# Patient Record
Sex: MALE | Race: OTHER | URBAN - METROPOLITAN AREA
[De-identification: names, ages, dates, MRNs, and addresses within clinical notes are randomized per-mention and may not be internally consistent; named-entity substitution may affect disease eponyms.]

---

## 2017-01-12 ENCOUNTER — HOSPITAL ENCOUNTER (OUTPATIENT)
Dept: LAB | Age: 42
Discharge: HOME | End: 2017-01-12

## 2017-01-12 ENCOUNTER — OFFICE VISIT (OUTPATIENT)
Dept: FAMILY MEDICINE CLINIC | Age: 42
End: 2017-01-12

## 2017-01-12 VITALS
WEIGHT: 136.8 LBS | HEIGHT: 65 IN | BODY MASS INDEX: 22.79 KG/M2 | DIASTOLIC BLOOD PRESSURE: 81 MMHG | HEART RATE: 59 BPM | SYSTOLIC BLOOD PRESSURE: 126 MMHG | TEMPERATURE: 98 F

## 2017-01-12 DIAGNOSIS — Z86.39 HISTORY OF HIGH CHOLESTEROL: ICD-10-CM

## 2017-01-12 DIAGNOSIS — Z23 ENCOUNTER FOR IMMUNIZATION: ICD-10-CM

## 2017-01-12 DIAGNOSIS — H61.22 IMPACTED CERUMEN OF LEFT EAR: ICD-10-CM

## 2017-01-12 DIAGNOSIS — N52.9 ERECTILE DYSFUNCTION, UNSPECIFIED ERECTILE DYSFUNCTION TYPE: ICD-10-CM

## 2017-01-12 DIAGNOSIS — R42 VERTIGO: Primary | ICD-10-CM

## 2017-01-12 LAB
ALBUMIN SERPL BCP-MCNC: 4.1 G/DL (ref 3.5–5)
ALBUMIN/GLOB SERPL: 1.1 {RATIO} (ref 1.1–2.2)
ALP SERPL-CCNC: 75 U/L (ref 45–117)
ALT SERPL-CCNC: 72 U/L (ref 12–78)
ANION GAP BLD CALC-SCNC: 6 MMOL/L (ref 5–15)
AST SERPL W P-5'-P-CCNC: 28 U/L (ref 15–37)
BILIRUB SERPL-MCNC: 0.3 MG/DL (ref 0.2–1)
BUN SERPL-MCNC: 15 MG/DL (ref 6–20)
BUN/CREAT SERPL: 17 (ref 12–20)
CALCIUM SERPL-MCNC: 8.9 MG/DL (ref 8.5–10.1)
CHLORIDE SERPL-SCNC: 100 MMOL/L (ref 97–108)
CHOLEST SERPL-MCNC: 170 MG/DL
CO2 SERPL-SCNC: 31 MMOL/L (ref 21–32)
CREAT SERPL-MCNC: 0.88 MG/DL (ref 0.7–1.3)
GLOBULIN SER CALC-MCNC: 3.8 G/DL (ref 2–4)
GLUCOSE SERPL-MCNC: 92 MG/DL (ref 65–100)
HDLC SERPL-MCNC: 40 MG/DL
HDLC SERPL: 4.3 {RATIO} (ref 0–5)
LDLC SERPL CALC-MCNC: 86 MG/DL (ref 0–100)
LIPID PROFILE,FLP: ABNORMAL
POTASSIUM SERPL-SCNC: 4.9 MMOL/L (ref 3.5–5.1)
PROT SERPL-MCNC: 7.9 G/DL (ref 6.4–8.2)
SODIUM SERPL-SCNC: 137 MMOL/L (ref 136–145)
TRIGL SERPL-MCNC: 220 MG/DL (ref ?–150)
VLDLC SERPL CALC-MCNC: 44 MG/DL

## 2017-01-12 PROCEDURE — 80061 LIPID PANEL: CPT | Performed by: PHYSICIAN ASSISTANT

## 2017-01-12 PROCEDURE — 80053 COMPREHEN METABOLIC PANEL: CPT | Performed by: PHYSICIAN ASSISTANT

## 2017-01-12 RX ORDER — MECLIZINE HYDROCHLORIDE 25 MG/1
25 TABLET ORAL
Qty: 30 TAB | Refills: 0 | Status: SHIPPED | OUTPATIENT
Start: 2017-01-12 | End: 2017-01-22

## 2017-01-12 NOTE — PROGRESS NOTES
Assessment/Plan:    Iza Sanchez was seen today for:    Diagnoses and all orders for this visit:    Vertigo  -     meclizine (ANTIVERT) 25 mg tablet; Take 1 Tab by mouth three (3) times daily as needed for up to 10 days. [place] 1 pastilla 3 veces al marilee por king mareo  Indications: VERTIGO    Erectile dysfunction, unspecified erectile dysfunction type  -     REFERRAL TO UROLOGY    History of high cholesterol  -     LIPID PANEL; Future  -     METABOLIC PANEL, COMPREHENSIVE; Future    Impacted cerumen of left ear  -     carbamide peroxide (DEBROX) 6.5 % otic solution; Administer 5 Drops into each ear two (2) times a day. [place] 5 gotas en king oido 2 veces al marilee        Follow-up Disposition: Not [facility] expressed understanding of this plan. An AVS was printed and given to the patient.      ----------------------------------------------------------------------    Chief Complaint   Patient presents with    Ear Pain     x 1 month with a fullness in the left ear       History of Present Illness:    New pt here for left ear \"feels blocked\" minimal pain and some vertigo sxs- like the room is spinning around him. He denies fever, cough, runny nose, aches or pains. The sxs have been present for at least 1 week now. He does not have any hx of HTN or DM. He states that about 5 years ago his cholesterol was \"high\". He has not had it checked since then but he came fasting today to have this done. He is a non smoker  He is an  by trade  The pt very tentatively brought up a 2 year problem with ED that is causing a lot of problems with his wife. He appears very uncomfortable discussing this with me and I offered to refer him to our male urologist which he was happy to accept. No past medical history on file. Current Outpatient Prescriptions   Medication Sig Dispense Refill    carbamide peroxide (DEBROX) 6.5 % otic solution Administer 5 Drops into each ear two (2) times a day. [place] 5 gotas en king oido 2 veces al marilee 7.5 mL 1    meclizine (ANTIVERT) 25 mg tablet Take 1 Tab by mouth three (3) times daily as needed for up to 10 days. [place] 1 pastilla 3 veces al marilee por king mareo  Indications: VERTIGO 30 Tab 0       Allergies no known allergies    Social History   Substance Use Topics    Smoking status: Never Smoker    Smokeless tobacco: Not on file    Alcohol use No       No family history on file. Physical Exam:     Visit Vitals    /81 (BP 1 Location: Right arm, BP Patient Position: Sitting)    Pulse (!) 59    Temp 98 °F (36.7 °C) (Oral)    Ht 5' 5.12\" (1.654 m)    Wt 136 lb 12.8 oz (62.1 kg)    BMI 22.68 kg/m2     Pt looks well, thin, not acute appearing  A&Ox3  WDWN NAD  Respirations normal and non labored  HEENT- left ear- thick, hard cerumen obscuring the distal end of the canal and all of the TM. Right ear w/out any abnormalities  Nares- minimal swelling of the turbinates.    OP clear  Neck supple  Neuro PERRLA, EOMI, CN 2-12 intact

## 2017-01-12 NOTE — PATIENT INSTRUCTIONS
Vértigo: Instrucciones de cuidado - [ Vertigo: Care Instructions ]  Instrucciones de cuidado  El vértigo es ellen sensación de que usted o el ambiente que le rodea se mueve cuando no es así. Con frecuencia se describe uma ellen sensación de girar, beka vueltas, caer o inclinarse. El vértigo podría hacer que vomite o sienta náuseas. Podría tener dificultades para caminar o estar de pie y podría perder el equilibrio. El vértigo es a [employer] relacionado con un problema del oído interno, anastasiia puede tener otras causas más serias. Si el vértigo continúa, usted podría necesitar más pruebas para hallar king causa. La atención de seguimiento es ellen parte clave de king tratamiento y seguridad. Asegúrese de hacer y acudir a todas las citas, y llame a king médico si está teniendo problemas. También es ellen buena idea saber los resultados de los exámenes y mantener ellen lista de los medicamentos que bryno. ¿Cómo puede cuidarse en el hogar? · No se acueste boca arriba. Elévese un poco. [place] podría reducir la sensación de girar. Mantenga los ojos abiertos. · Muévase despacio para no caer. · Si king médico le recomienda algún medicamento, tómelo exactamente según las indicaciones. · No conduzca cuando tenga vértigo. Ciertos ejercicios, conocidos uma ejercicios de [place], pueden ayudar a disminuir el vértigo. Para hacer los ejercicios de Raquel:  · Siéntese al borde de ellen cama o un sofá y acuéstese rápido del lado que le causa el peor vértigo. Acuéstese de lado, sobre el oído [place]. · Quédese en victorina posición teofilo por lo menos 30 segundos o hasta que el vértigo pase. · Siéntese. Si esto le causa vértigo, espere a que pase. · Repita luna procedimiento del otro lado. · Repita esto 10 veces. Leticia estos ejercicios 2 veces al día hasta que la sensación de vértigo desaparezca. ¿Cuándo debe pedir ayuda? Llame al 911 en cualquier momento que considere que necesita atención de emergencia.  Por ejemplo, llame si:  · Se desmayó (perdió el conocimiento). · Tiene síntomas de un ataque cerebral. Estos podrían incluir:  ¨ Entumecimiento, hormigueo, debilitamiento o pérdida de movimiento repentinos en la quoc, el brazo o la pierna, sobre todo si ocurre en un solo lado del cuerpo. ¨ Cambios repentinos en la visión. ¨ Dificultades repentinas para hablar. ¨ Confusión repentina o dificultad para comprender frases sencillas. ¨ Problemas repentinos para caminar o mantener el equilibrio. ¨ Dolor de [place] intenso y repentino, distinto de los flaca de victor hugo anteriores. Llame a king médico ahora mismo o busque atención médica inmediata si:  · Tiene vértigo junto con fiebre, dolor de victor hugo o zumbido en los oídos. · Tiene náuseas o vómito nuevos o éstos aumentan. Preste especial atención a los cambios en king letha y asegúrese de comunicarse con king médico si:  · El vértigo empeora u ocurre con mayor frecuencia. · El vértigo no mejora después de 2 semanas. ¿Dónde puede encontrar más información en inglés? Thomas Lambert a [email or web address] Abbe Carrion [id number] en la búsqueda para aprender más acerca de \"Vértigo: Instrucciones de cuidado - [ Vertigo: Care Instructions ]. \"  Revisado: 29 julio, 2016  Versión del contenido: 11.1  © 4227-3968 Healthwise, Incorporated. Las instrucciones de cuidado fueron adaptadas bajo licencia por [employer] (which disclaims liability or warranty for this information). Si usted tiene [place] afección médica o sobre estas instrucciones, siempre pregunte a king profesional de letha. Healthwise, Incorporated niega toda garantía o responsabilidad por king uso de esta información. Bloqueo de cerumen: Instrucciones de cuidado - [ Earwax Blockage: Care Instructions ]  Instrucciones de cuidado    El cerumen es ellen sustancia natural que protege el canal [place]. En general, el cerumen drena de los oídos y no causa problemas. A veces, se acumula y se endurece.  El bloqueo por cerumen (también llamada impactación del cerumen) puede causar algo de pérdida de la audición y dolor. Cuando el cerumen está muy compactado necesitará que un médico lo extraiga. La atención de seguimiento es ellen parte clave de king tratamiento y seguridad. Asegúrese de hacer y acudir a todas las citas, y llame a king médico si está teniendo problemas. También es ellen buena idea saber los resultados de los exámenes y mantener ellen lista de los medicamentos que bryon. ¿Cómo puede cuidarse en el hogar? · No intente extraer el cerumen con hisopos de algodón, dedos u otros objetos. [place] puede empeorar la obstrucción y dañar el tímpano. · Si king médico le recomienda que trate de extraerse el cerumen en casa:  ¨ [place] y afloje el cerumen con aceite mineral tibio. También puede tratar de usar peróxido de hidrógeno mezclado con ellen cantidad igual de agua a temperatura ambiente. [place] en el oído 2 gotas del líquido calentado a la temperatura del cuerpo, dos veces al día por un demetrius de 5 días. ¨ Ellen vez que la cera está suelta y [place], por lo general todo lo que se necesita para sacarla del canal auditivo es ellen [place] y tibia. Dirija el agua hacia el oído y luego incline king victor hugo para permitir que drene el cerumen. Seque el oído completamente con un secador de pelo a baja temperatura. Sostenga la secadora a varias pulgadas del oído. ¨ Si el aceite mineral tibio y la ducha no funcionan, utilice un ablandador de cera de venta yovani seguido por un lavado suave con [place] de oído todas las noches teofilo ellen o [place]. Asegúrese de que la solución de lavado esté a la temperatura corporal. Los líquidos fríos o calientes en el oído pueden ocasionar [employer]. ¿Cuándo debe pedir ayuda? Llame a king médico ahora mismo o busque atención médica inmediata si:  · Le sale pus o rakesh del oído. · Tiene un zumbido en el oído o lo siente lleno. · Tiene pérdida de la audición.   Preste especial atención a los cambios en king letha y asegúrese de comunicarse con king médico si:  · Tiene dolor o se le ha reducido la audición después de 1 semana de tratamiento casero. · Tiene algún síntoma nuevo, uma náuseas o problemas de equilibrio. ¿Dónde puede encontrar más información en inglés? Cassie Devries a [email or web address]-giorgi.[email or web address] Keegan Montoya [id number] en la búsqueda para aprender más acerca de \"Bloqueo de cerumen: Instrucciones de cuidado - [ Earwax Blockage: Care Instructions ]. \"  Revisado: 27 rapp, 2016  Versión del contenido: 11.1  © 4729-7821 Healthwise, Incorporated. Las instrucciones de cuidado fueron adaptadas bajo licencia por [employer] (which disclaims liability or warranty for this information). Si usted tiene [place] afección médica o sobre estas instrucciones, siempre pregunte a king profesional de letha. Healthwise, Incorporated niega toda garantía o responsabilidad por king uso de esta información.

## 2017-01-12 NOTE — PROGRESS NOTES
Printed AVS, provided to pt and reviewed. Pt indicated understanding and had no questions. Told pt that rx's have been sent to pharmacy and they should be ready for  in approximately 2 hrs. Reviewed all medications ordered with the patient. Reviewed information regarding irrigating the ear. Requests flu vaccine; denies fever, egg allergy. Immunization given per protocol and recorded in [address]. VIS information sheet given, explained possible S/E. Reviewed sx indicating need to be seen in ER. Pt had no adverse reaction at time of discharge. Sent pt to registration to schedule f/u appt with Dr Miranda Hodgkin. Summer Miller RN

## 2017-01-13 NOTE — PROGRESS NOTES
Recent testing shows slight elevation in triglycerides. Would you send the pt info on low cholesterol diet?  Thank you

## 2017-01-16 NOTE — PROGRESS NOTES
Low cholesterol diet information printed from the clinical reference tools in Malaysian and mailed to the patient.  Justin Hobbs RN

## 2024-05-09 ENCOUNTER — HOSPITAL ENCOUNTER (OUTPATIENT)
Facility: HOSPITAL | Age: 49
Setting detail: SPECIMEN
Discharge: HOME | End: 2024-05-12

## 2024-05-09 PROCEDURE — 85025 COMPLETE CBC W/AUTO DIFF WBC: CPT

## 2024-05-09 PROCEDURE — 80061 LIPID PANEL: CPT

## 2024-05-09 PROCEDURE — 83036 HEMOGLOBIN GLYCOSYLATED A1C: CPT

## 2024-05-09 PROCEDURE — 36415 COLL VENOUS BLD VENIPUNCTURE: CPT

## 2024-05-09 PROCEDURE — 80053 COMPREHEN METABOLIC PANEL: CPT

## 2024-05-10 LAB
ALBUMIN SERPL-MCNC: 4.1 G/DL (ref 3.5–5)
ALBUMIN/GLOB SERPL: 1.1 (ref 1.1–2.2)
ALP SERPL-CCNC: 75 U/L (ref 45–117)
ALT SERPL-CCNC: 31 U/L (ref 12–78)
ANION GAP SERPL CALC-SCNC: 4 MMOL/L (ref 5–15)
AST SERPL-CCNC: 16 U/L (ref 15–37)
BASOPHILS # BLD: 0.1 K/UL (ref 0–0.1)
BASOPHILS NFR BLD: 1 % (ref 0–1)
BILIRUB SERPL-MCNC: 0.4 MG/DL (ref 0.2–1)
BUN SERPL-MCNC: 17 MG/DL (ref 6–20)
BUN/CREAT SERPL: 15 (ref 12–20)
CALCIUM SERPL-MCNC: 8.9 MG/DL (ref 8.5–10.1)
CHLORIDE SERPL-SCNC: 107 MMOL/L (ref 97–108)
CHOLEST SERPL-MCNC: 161 MG/DL
CO2 SERPL-SCNC: 28 MMOL/L (ref 21–32)
CREAT SERPL-MCNC: 1.13 MG/DL (ref 0.7–1.3)
DIFFERENTIAL METHOD BLD: NORMAL
EOSINOPHIL # BLD: 0.2 K/UL (ref 0–0.4)
EOSINOPHIL NFR BLD: 3 % (ref 0–7)
ERYTHROCYTE [DISTWIDTH] IN BLOOD BY AUTOMATED COUNT: 12.7 % (ref 11.5–14.5)
EST. AVERAGE GLUCOSE BLD GHB EST-MCNC: 108 MG/DL
GLOBULIN SER CALC-MCNC: 3.9 G/DL (ref 2–4)
GLUCOSE SERPL-MCNC: 99 MG/DL (ref 65–100)
HBA1C MFR BLD: 5.4 % (ref 4–5.6)
HCT VFR BLD AUTO: 44.1 % (ref 36.6–50.3)
HDLC SERPL-MCNC: 42 MG/DL
HDLC SERPL: 3.8 (ref 0–5)
HGB BLD-MCNC: 14.7 G/DL (ref 12.1–17)
IMM GRANULOCYTES # BLD AUTO: 0 K/UL (ref 0–0.04)
IMM GRANULOCYTES NFR BLD AUTO: 0 % (ref 0–0.5)
LDLC SERPL CALC-MCNC: 84.4 MG/DL (ref 0–100)
LYMPHOCYTES # BLD: 2.5 K/UL (ref 0.8–3.5)
LYMPHOCYTES NFR BLD: 31 % (ref 12–49)
MCH RBC QN AUTO: 29.8 PG (ref 26–34)
MCHC RBC AUTO-ENTMCNC: 33.3 G/DL (ref 30–36.5)
MCV RBC AUTO: 89.5 FL (ref 80–99)
MONOCYTES # BLD: 0.6 K/UL (ref 0–1)
MONOCYTES NFR BLD: 8 % (ref 5–13)
NEUTS SEG # BLD: 4.4 K/UL (ref 1.8–8)
NEUTS SEG NFR BLD: 57 % (ref 32–75)
NRBC # BLD: 0 K/UL (ref 0–0.01)
NRBC BLD-RTO: 0 PER 100 WBC
PLATELET # BLD AUTO: 358 K/UL (ref 150–400)
PMV BLD AUTO: 9.7 FL (ref 8.9–12.9)
POTASSIUM SERPL-SCNC: 3.8 MMOL/L (ref 3.5–5.1)
PROT SERPL-MCNC: 8 G/DL (ref 6.4–8.2)
RBC # BLD AUTO: 4.93 M/UL (ref 4.1–5.7)
SODIUM SERPL-SCNC: 139 MMOL/L (ref 136–145)
TRIGL SERPL-MCNC: 173 MG/DL
VLDLC SERPL CALC-MCNC: 34.6 MG/DL
WBC # BLD AUTO: 7.8 K/UL (ref 4.1–11.1)